# Patient Record
Sex: MALE | Race: WHITE | NOT HISPANIC OR LATINO | Employment: FULL TIME | URBAN - METROPOLITAN AREA
[De-identification: names, ages, dates, MRNs, and addresses within clinical notes are randomized per-mention and may not be internally consistent; named-entity substitution may affect disease eponyms.]

---

## 2018-09-12 ENCOUNTER — TRANSCRIBE ORDERS (OUTPATIENT)
Dept: ADMINISTRATIVE | Facility: HOSPITAL | Age: 43
End: 2018-09-12

## 2018-09-12 ENCOUNTER — HOSPITAL ENCOUNTER (OUTPATIENT)
Dept: RADIOLOGY | Facility: HOSPITAL | Age: 43
Discharge: HOME | End: 2018-09-12
Attending: FAMILY MEDICINE
Payer: COMMERCIAL

## 2018-09-12 DIAGNOSIS — M54.9 DORSALGIA: Primary | ICD-10-CM

## 2018-09-12 DIAGNOSIS — M54.9 DORSALGIA: ICD-10-CM

## 2018-09-12 PROCEDURE — 72110 X-RAY EXAM L-2 SPINE 4/>VWS: CPT

## 2019-06-02 ENCOUNTER — HOSPITAL ENCOUNTER (INPATIENT)
Facility: HOSPITAL | Age: 44
LOS: 2 days | Discharge: HOME | DRG: 871 | End: 2019-06-05
Attending: EMERGENCY MEDICINE | Admitting: INTERNAL MEDICINE

## 2019-06-02 ENCOUNTER — APPOINTMENT (EMERGENCY)
Dept: RADIOLOGY | Facility: HOSPITAL | Age: 44
DRG: 871 | End: 2019-06-02

## 2019-06-02 DIAGNOSIS — R79.82 CRP ELEVATED: ICD-10-CM

## 2019-06-02 DIAGNOSIS — E87.2 LACTIC ACID ACIDOSIS: ICD-10-CM

## 2019-06-02 DIAGNOSIS — R79.89 ELEVATED PROCALCITONIN: ICD-10-CM

## 2019-06-02 DIAGNOSIS — R51.9 HEADACHE: Primary | ICD-10-CM

## 2019-06-02 DIAGNOSIS — R50.9 FEVER: ICD-10-CM

## 2019-06-02 DIAGNOSIS — N17.9 AKI (ACUTE KIDNEY INJURY) (HCC): ICD-10-CM

## 2019-06-02 PROBLEM — G03.9 MENINGITIS: Status: ACTIVE | Noted: 2019-06-02

## 2019-06-02 PROBLEM — A41.9 SEPSIS (HCC): Status: ACTIVE | Noted: 2019-06-02

## 2019-06-02 LAB
ANION GAP SERPL CALCULATED.3IONS-SCNC: 9 MMOL/L (ref 4–13)
BASOPHILS # BLD AUTO: 0.07 THOUSANDS/ΜL (ref 0–0.1)
BASOPHILS NFR BLD AUTO: 1 % (ref 0–1)
BILIRUB UR QL STRIP: ABNORMAL
BUN SERPL-MCNC: 17 MG/DL (ref 5–25)
CALCIUM SERPL-MCNC: 8.5 MG/DL (ref 8.3–10.1)
CHLORIDE SERPL-SCNC: 101 MMOL/L (ref 100–108)
CLARITY UR: CLEAR
CO2 SERPL-SCNC: 29 MMOL/L (ref 21–32)
COLOR UR: ABNORMAL
COLOR, POC: NORMAL
CREAT SERPL-MCNC: 1.43 MG/DL (ref 0.6–1.3)
CRP SERPL QL: >90 MG/L
EOSINOPHIL # BLD AUTO: 0.01 THOUSAND/ΜL (ref 0–0.61)
EOSINOPHIL NFR BLD AUTO: 0 % (ref 0–6)
ERYTHROCYTE [DISTWIDTH] IN BLOOD BY AUTOMATED COUNT: 13.1 % (ref 11.6–15.1)
GFR SERPL CREATININE-BSD FRML MDRD: 60 ML/MIN/1.73SQ M
GLUCOSE SERPL-MCNC: 112 MG/DL (ref 65–140)
GLUCOSE UR STRIP-MCNC: NEGATIVE MG/DL
HCT VFR BLD AUTO: 47.4 % (ref 36.5–49.3)
HGB BLD-MCNC: 15.2 G/DL (ref 12–17)
HGB UR QL STRIP.AUTO: NEGATIVE
IMM GRANULOCYTES # BLD AUTO: 0.04 THOUSAND/UL (ref 0–0.2)
IMM GRANULOCYTES NFR BLD AUTO: 1 % (ref 0–2)
KETONES UR STRIP-MCNC: ABNORMAL MG/DL
LACTATE SERPL-SCNC: 3.1 MMOL/L (ref 0.5–2)
LEUKOCYTE ESTERASE UR QL STRIP: NEGATIVE
LYMPHOCYTES # BLD AUTO: 1.18 THOUSANDS/ΜL (ref 0.6–4.47)
LYMPHOCYTES NFR BLD AUTO: 13 % (ref 14–44)
MCH RBC QN AUTO: 29.3 PG (ref 26.8–34.3)
MCHC RBC AUTO-ENTMCNC: 32.1 G/DL (ref 31.4–37.4)
MCV RBC AUTO: 91 FL (ref 82–98)
MONOCYTES # BLD AUTO: 0.66 THOUSAND/ΜL (ref 0.17–1.22)
MONOCYTES NFR BLD AUTO: 8 % (ref 4–12)
NEUTROPHILS # BLD AUTO: 6.86 THOUSANDS/ΜL (ref 1.85–7.62)
NEUTS SEG NFR BLD AUTO: 77 % (ref 43–75)
NITRITE UR QL STRIP: NEGATIVE
NRBC BLD AUTO-RTO: 0 /100 WBCS
PH UR STRIP.AUTO: 6 [PH] (ref 4.5–8)
PLATELET # BLD AUTO: 168 THOUSANDS/UL (ref 149–390)
PMV BLD AUTO: 12.3 FL (ref 8.9–12.7)
POTASSIUM SERPL-SCNC: 4.1 MMOL/L (ref 3.5–5.3)
PROCALCITONIN SERPL-MCNC: 0.47 NG/ML
PROT UR STRIP-MCNC: ABNORMAL MG/DL
RBC # BLD AUTO: 5.19 MILLION/UL (ref 3.88–5.62)
SODIUM SERPL-SCNC: 139 MMOL/L (ref 136–145)
SP GR UR STRIP.AUTO: >=1.03 (ref 1–1.03)
UROBILINOGEN UR QL STRIP.AUTO: 4 E.U./DL
WBC # BLD AUTO: 8.82 THOUSAND/UL (ref 4.31–10.16)

## 2019-06-02 PROCEDURE — 009U3ZX DRAINAGE OF SPINAL CANAL, PERCUTANEOUS APPROACH, DIAGNOSTIC: ICD-10-PCS | Performed by: EMERGENCY MEDICINE

## 2019-06-02 PROCEDURE — 82945 GLUCOSE OTHER FLUID: CPT | Performed by: EMERGENCY MEDICINE

## 2019-06-02 PROCEDURE — 96365 THER/PROPH/DIAG IV INF INIT: CPT

## 2019-06-02 PROCEDURE — 70450 CT HEAD/BRAIN W/O DYE: CPT

## 2019-06-02 PROCEDURE — 86140 C-REACTIVE PROTEIN: CPT | Performed by: EMERGENCY MEDICINE

## 2019-06-02 PROCEDURE — 36415 COLL VENOUS BLD VENIPUNCTURE: CPT | Performed by: EMERGENCY MEDICINE

## 2019-06-02 PROCEDURE — 84145 PROCALCITONIN (PCT): CPT | Performed by: EMERGENCY MEDICINE

## 2019-06-02 PROCEDURE — 89050 BODY FLUID CELL COUNT: CPT | Performed by: EMERGENCY MEDICINE

## 2019-06-02 PROCEDURE — 96361 HYDRATE IV INFUSION ADD-ON: CPT

## 2019-06-02 PROCEDURE — 80048 BASIC METABOLIC PNL TOTAL CA: CPT | Performed by: EMERGENCY MEDICINE

## 2019-06-02 PROCEDURE — 87483 CNS DNA AMP PROBE TYPE 12-25: CPT | Performed by: EMERGENCY MEDICINE

## 2019-06-02 PROCEDURE — 71046 X-RAY EXAM CHEST 2 VIEWS: CPT

## 2019-06-02 PROCEDURE — 99285 EMERGENCY DEPT VISIT HI MDM: CPT

## 2019-06-02 PROCEDURE — 84157 ASSAY OF PROTEIN OTHER: CPT | Performed by: EMERGENCY MEDICINE

## 2019-06-02 PROCEDURE — 81001 URINALYSIS AUTO W/SCOPE: CPT

## 2019-06-02 PROCEDURE — 85025 COMPLETE CBC W/AUTO DIFF WBC: CPT | Performed by: EMERGENCY MEDICINE

## 2019-06-02 PROCEDURE — 89051 BODY FLUID CELL COUNT: CPT | Performed by: EMERGENCY MEDICINE

## 2019-06-02 PROCEDURE — 87040 BLOOD CULTURE FOR BACTERIA: CPT | Performed by: EMERGENCY MEDICINE

## 2019-06-02 PROCEDURE — 83605 ASSAY OF LACTIC ACID: CPT | Performed by: EMERGENCY MEDICINE

## 2019-06-02 PROCEDURE — 87070 CULTURE OTHR SPECIMN AEROBIC: CPT | Performed by: EMERGENCY MEDICINE

## 2019-06-02 PROCEDURE — 99285 EMERGENCY DEPT VISIT HI MDM: CPT | Performed by: EMERGENCY MEDICINE

## 2019-06-02 PROCEDURE — 96375 TX/PRO/DX INJ NEW DRUG ADDON: CPT

## 2019-06-02 RX ORDER — ACETAMINOPHEN 325 MG/1
975 TABLET ORAL ONCE
Status: COMPLETED | OUTPATIENT
Start: 2019-06-02 | End: 2019-06-02

## 2019-06-02 RX ORDER — LORAZEPAM 2 MG/ML
0.5 INJECTION INTRAMUSCULAR ONCE
Status: COMPLETED | OUTPATIENT
Start: 2019-06-02 | End: 2019-06-02

## 2019-06-02 RX ORDER — ONDANSETRON 2 MG/ML
4 INJECTION INTRAMUSCULAR; INTRAVENOUS ONCE
Status: COMPLETED | OUTPATIENT
Start: 2019-06-02 | End: 2019-06-02

## 2019-06-02 RX ADMIN — ACETAMINOPHEN 975 MG: 325 TABLET ORAL at 20:09

## 2019-06-02 RX ADMIN — CEFTRIAXONE SODIUM 2000 MG: 10 INJECTION, POWDER, FOR SOLUTION INTRAVENOUS at 23:47

## 2019-06-02 RX ADMIN — LORAZEPAM 0.5 MG: 2 INJECTION INTRAMUSCULAR; INTRAVENOUS at 23:46

## 2019-06-02 RX ADMIN — SODIUM CHLORIDE 1000 ML: 0.9 INJECTION, SOLUTION INTRAVENOUS at 21:51

## 2019-06-02 RX ADMIN — SODIUM CHLORIDE 1000 ML: 0.9 INJECTION, SOLUTION INTRAVENOUS at 20:12

## 2019-06-02 RX ADMIN — ONDANSETRON 4 MG: 2 INJECTION INTRAMUSCULAR; INTRAVENOUS at 20:11

## 2019-06-03 PROBLEM — G44.001 INTRACTABLE CLUSTER HEADACHE SYNDROME: Status: ACTIVE | Noted: 2019-06-03

## 2019-06-03 PROBLEM — G03.0 ASEPTIC MENINGITIS: Status: ACTIVE | Noted: 2019-06-02

## 2019-06-03 LAB
ANION GAP SERPL CALCULATED.3IONS-SCNC: 6 MMOL/L (ref 4–13)
APPEARANCE CSF: CLEAR
BACTERIA UR QL AUTO: ABNORMAL /HPF
BUN SERPL-MCNC: 13 MG/DL (ref 5–25)
C GATTII+NEOFOR DNA CSF QL NAA+NON-PROBE: NOT DETECTED
CALCIUM SERPL-MCNC: 7.6 MG/DL (ref 8.3–10.1)
CHLORIDE SERPL-SCNC: 105 MMOL/L (ref 100–108)
CMV DNA CSF QL NAA+NON-PROBE: NOT DETECTED
CO2 SERPL-SCNC: 25 MMOL/L (ref 21–32)
CREAT SERPL-MCNC: 1.24 MG/DL (ref 0.6–1.3)
E COLI K1 DNA CSF QL NAA+NON-PROBE: NOT DETECTED
ERYTHROCYTE [DISTWIDTH] IN BLOOD BY AUTOMATED COUNT: 13.1 % (ref 11.6–15.1)
EV RNA CSF QL NAA+NON-PROBE: NOT DETECTED
FINE GRAN CASTS URNS QL MICRO: ABNORMAL /LPF
GFR SERPL CREATININE-BSD FRML MDRD: 71 ML/MIN/1.73SQ M
GLUCOSE CSF-MCNC: 71 MG/DL (ref 50–80)
GLUCOSE SERPL-MCNC: 124 MG/DL (ref 65–140)
GP B STREP DNA CSF QL NAA+NON-PROBE: NOT DETECTED
GRAM STN SPEC: NORMAL
GRAM STN SPEC: NORMAL
HAEM INFLU DNA CSF QL NAA+NON-PROBE: NOT DETECTED
HCT VFR BLD AUTO: 40.7 % (ref 36.5–49.3)
HGB BLD-MCNC: 13 G/DL (ref 12–17)
HHV6 DNA CSF QL NAA+NON-PROBE: NOT DETECTED
HSV1 DNA CSF QL NAA+NON-PROBE: NOT DETECTED
HSV2 DNA CSF QL NAA+NON-PROBE: NOT DETECTED
HYALINE CASTS #/AREA URNS LPF: ABNORMAL /LPF
L MONOCYTOG DNA CSF QL NAA+NON-PROBE: NOT DETECTED
LACTATE SERPL-SCNC: 1.1 MMOL/L (ref 0.5–2)
LYMPHOCYTES NFR CSF MANUAL: 79 %
MCH RBC QN AUTO: 29.1 PG (ref 26.8–34.3)
MCHC RBC AUTO-ENTMCNC: 31.9 G/DL (ref 31.4–37.4)
MCV RBC AUTO: 91 FL (ref 82–98)
MONOS+MACROS CSF MANUAL: 14 %
MUCOUS THREADS UR QL AUTO: ABNORMAL
N MEN DNA CSF QL NAA+NON-PROBE: NOT DETECTED
NEUTROPHILS NFR CSF MANUAL: 7 %
NON-SQ EPI CELLS URNS QL MICRO: ABNORMAL /HPF
PARECHOVIRUS A RNA CSF QL NAA+NON-PROBE: NOT DETECTED
PATHOLOGIST INTERPRETATION: NORMAL
PATHOLOGY REVIEW: YES
PLATELET # BLD AUTO: 144 THOUSANDS/UL (ref 149–390)
PMV BLD AUTO: 12.6 FL (ref 8.9–12.7)
POTASSIUM SERPL-SCNC: 4.1 MMOL/L (ref 3.5–5.3)
PROCALCITONIN SERPL-MCNC: 0.39 NG/ML
PROT CSF-MCNC: 25 MG/DL (ref 15–45)
RBC # BLD AUTO: 4.47 MILLION/UL (ref 3.88–5.62)
RBC # CSF MANUAL: 2000 UL (ref 0–10)
RBC # CSF MANUAL: 93 UL (ref 0–10)
RBC #/AREA URNS AUTO: ABNORMAL /HPF
S PNEUM DNA CSF QL NAA+NON-PROBE: NOT DETECTED
SODIUM SERPL-SCNC: 136 MMOL/L (ref 136–145)
TOTAL CELLS COUNTED BLD: NO
TOTAL CELLS COUNTED SPEC: 28
TUBE # CSF: 4
VZV DNA CSF QL NAA+NON-PROBE: NOT DETECTED
WBC # BLD AUTO: 6.22 THOUSAND/UL (ref 4.31–10.16)
WBC # CSF AUTO: 2 /UL (ref 0–5)
WBC #/AREA URNS AUTO: ABNORMAL /HPF

## 2019-06-03 PROCEDURE — 80048 BASIC METABOLIC PNL TOTAL CA: CPT | Performed by: INTERNAL MEDICINE

## 2019-06-03 PROCEDURE — 84145 PROCALCITONIN (PCT): CPT | Performed by: INTERNAL MEDICINE

## 2019-06-03 PROCEDURE — 99233 SBSQ HOSP IP/OBS HIGH 50: CPT | Performed by: NURSE PRACTITIONER

## 2019-06-03 PROCEDURE — 99220 PR INITIAL OBSERVATION CARE/DAY 70 MINUTES: CPT | Performed by: INTERNAL MEDICINE

## 2019-06-03 PROCEDURE — 36415 COLL VENOUS BLD VENIPUNCTURE: CPT | Performed by: INTERNAL MEDICINE

## 2019-06-03 PROCEDURE — 85027 COMPLETE CBC AUTOMATED: CPT | Performed by: INTERNAL MEDICINE

## 2019-06-03 RX ORDER — METOCLOPRAMIDE HYDROCHLORIDE 5 MG/ML
10 INJECTION INTRAMUSCULAR; INTRAVENOUS EVERY 8 HOURS SCHEDULED
Status: DISCONTINUED | OUTPATIENT
Start: 2019-06-03 | End: 2019-06-05 | Stop reason: HOSPADM

## 2019-06-03 RX ORDER — CYCLOBENZAPRINE HCL 10 MG
10 TABLET ORAL EVERY MORNING
Status: COMPLETED | OUTPATIENT
Start: 2019-06-03 | End: 2019-06-05

## 2019-06-03 RX ORDER — ONDANSETRON 2 MG/ML
4 INJECTION INTRAMUSCULAR; INTRAVENOUS EVERY 6 HOURS PRN
Status: DISCONTINUED | OUTPATIENT
Start: 2019-06-03 | End: 2019-06-05 | Stop reason: HOSPADM

## 2019-06-03 RX ORDER — MAGNESIUM SULFATE HEPTAHYDRATE 40 MG/ML
2 INJECTION, SOLUTION INTRAVENOUS ONCE
Status: COMPLETED | OUTPATIENT
Start: 2019-06-03 | End: 2019-06-03

## 2019-06-03 RX ORDER — KETOROLAC TROMETHAMINE 30 MG/ML
30 INJECTION, SOLUTION INTRAMUSCULAR; INTRAVENOUS EVERY 12 HOURS SCHEDULED
Status: DISCONTINUED | OUTPATIENT
Start: 2019-06-03 | End: 2019-06-05 | Stop reason: HOSPADM

## 2019-06-03 RX ORDER — ACETAMINOPHEN 325 MG/1
650 TABLET ORAL EVERY 4 HOURS PRN
Status: DISCONTINUED | OUTPATIENT
Start: 2019-06-03 | End: 2019-06-05 | Stop reason: HOSPADM

## 2019-06-03 RX ORDER — OXYCODONE HYDROCHLORIDE 10 MG/1
10 TABLET ORAL EVERY 4 HOURS PRN
Status: DISCONTINUED | OUTPATIENT
Start: 2019-06-03 | End: 2019-06-05 | Stop reason: HOSPADM

## 2019-06-03 RX ORDER — HYDROMORPHONE HCL/PF 1 MG/ML
0.5 SYRINGE (ML) INJECTION EVERY 4 HOURS PRN
Status: DISCONTINUED | OUTPATIENT
Start: 2019-06-03 | End: 2019-06-04

## 2019-06-03 RX ORDER — OXYCODONE HYDROCHLORIDE 5 MG/1
5 TABLET ORAL EVERY 4 HOURS PRN
Status: DISCONTINUED | OUTPATIENT
Start: 2019-06-03 | End: 2019-06-05 | Stop reason: HOSPADM

## 2019-06-03 RX ORDER — SODIUM CHLORIDE 9 MG/ML
125 INJECTION, SOLUTION INTRAVENOUS CONTINUOUS
Status: DISCONTINUED | OUTPATIENT
Start: 2019-06-03 | End: 2019-06-05 | Stop reason: HOSPADM

## 2019-06-03 RX ORDER — DIPHENHYDRAMINE HYDROCHLORIDE 50 MG/ML
25 INJECTION INTRAMUSCULAR; INTRAVENOUS EVERY 8 HOURS PRN
Status: DISCONTINUED | OUTPATIENT
Start: 2019-06-03 | End: 2019-06-05 | Stop reason: HOSPADM

## 2019-06-03 RX ORDER — HEPARIN SODIUM 5000 [USP'U]/ML
5000 INJECTION, SOLUTION INTRAVENOUS; SUBCUTANEOUS EVERY 8 HOURS SCHEDULED
Status: DISCONTINUED | OUTPATIENT
Start: 2019-06-03 | End: 2019-06-04

## 2019-06-03 RX ADMIN — HEPARIN SODIUM 5000 UNITS: 5000 INJECTION INTRAVENOUS; SUBCUTANEOUS at 06:46

## 2019-06-03 RX ADMIN — OXYCODONE HYDROCHLORIDE 10 MG: 10 TABLET ORAL at 10:58

## 2019-06-03 RX ADMIN — METOCLOPRAMIDE 10 MG: 5 INJECTION, SOLUTION INTRAMUSCULAR; INTRAVENOUS at 14:01

## 2019-06-03 RX ADMIN — DIPHENHYDRAMINE HYDROCHLORIDE 25 MG: 50 INJECTION, SOLUTION INTRAMUSCULAR; INTRAVENOUS at 11:41

## 2019-06-03 RX ADMIN — METOCLOPRAMIDE 10 MG: 5 INJECTION, SOLUTION INTRAMUSCULAR; INTRAVENOUS at 21:28

## 2019-06-03 RX ADMIN — CYCLOBENZAPRINE HYDROCHLORIDE 10 MG: 10 TABLET, FILM COATED ORAL at 11:39

## 2019-06-03 RX ADMIN — KETOROLAC TROMETHAMINE 30 MG: 30 INJECTION, SOLUTION INTRAMUSCULAR at 11:40

## 2019-06-03 RX ADMIN — HEPARIN SODIUM 5000 UNITS: 5000 INJECTION INTRAVENOUS; SUBCUTANEOUS at 14:01

## 2019-06-03 RX ADMIN — HEPARIN SODIUM 5000 UNITS: 5000 INJECTION INTRAVENOUS; SUBCUTANEOUS at 21:28

## 2019-06-03 RX ADMIN — HYDROMORPHONE HYDROCHLORIDE 0.5 MG: 1 INJECTION, SOLUTION INTRAMUSCULAR; INTRAVENOUS; SUBCUTANEOUS at 08:26

## 2019-06-03 RX ADMIN — MAGNESIUM SULFATE HEPTAHYDRATE 2 G: 40 INJECTION, SOLUTION INTRAVENOUS at 11:44

## 2019-06-03 RX ADMIN — KETOROLAC TROMETHAMINE 30 MG: 30 INJECTION, SOLUTION INTRAMUSCULAR at 21:28

## 2019-06-03 RX ADMIN — OXYCODONE HYDROCHLORIDE 5 MG: 5 TABLET ORAL at 02:31

## 2019-06-03 RX ADMIN — ONDANSETRON 4 MG: 2 INJECTION INTRAMUSCULAR; INTRAVENOUS at 11:51

## 2019-06-03 RX ADMIN — ACETAMINOPHEN 650 MG: 325 TABLET, FILM COATED ORAL at 05:07

## 2019-06-03 RX ADMIN — OXYCODONE HYDROCHLORIDE 5 MG: 5 TABLET ORAL at 06:45

## 2019-06-03 RX ADMIN — SODIUM CHLORIDE 125 ML/HR: 0.9 INJECTION, SOLUTION INTRAVENOUS at 11:01

## 2019-06-03 RX ADMIN — ACETAMINOPHEN 650 MG: 325 TABLET, FILM COATED ORAL at 14:01

## 2019-06-03 RX ADMIN — SODIUM CHLORIDE 125 ML/HR: 0.9 INJECTION, SOLUTION INTRAVENOUS at 02:32

## 2019-06-04 PROCEDURE — 99233 SBSQ HOSP IP/OBS HIGH 50: CPT | Performed by: NURSE PRACTITIONER

## 2019-06-04 RX ADMIN — ACETAMINOPHEN 650 MG: 325 TABLET, FILM COATED ORAL at 02:01

## 2019-06-04 RX ADMIN — METOCLOPRAMIDE 10 MG: 5 INJECTION, SOLUTION INTRAMUSCULAR; INTRAVENOUS at 06:49

## 2019-06-04 RX ADMIN — METOCLOPRAMIDE 10 MG: 5 INJECTION, SOLUTION INTRAMUSCULAR; INTRAVENOUS at 14:10

## 2019-06-04 RX ADMIN — SODIUM CHLORIDE 125 ML/HR: 0.9 INJECTION, SOLUTION INTRAVENOUS at 10:14

## 2019-06-04 RX ADMIN — METOCLOPRAMIDE 10 MG: 5 INJECTION, SOLUTION INTRAMUSCULAR; INTRAVENOUS at 21:37

## 2019-06-04 RX ADMIN — CYCLOBENZAPRINE HYDROCHLORIDE 10 MG: 10 TABLET, FILM COATED ORAL at 07:57

## 2019-06-04 RX ADMIN — OXYCODONE HYDROCHLORIDE 5 MG: 5 TABLET ORAL at 01:53

## 2019-06-04 RX ADMIN — SODIUM CHLORIDE 125 ML/HR: 0.9 INJECTION, SOLUTION INTRAVENOUS at 02:01

## 2019-06-04 RX ADMIN — KETOROLAC TROMETHAMINE 30 MG: 30 INJECTION, SOLUTION INTRAMUSCULAR at 07:57

## 2019-06-04 RX ADMIN — KETOROLAC TROMETHAMINE 30 MG: 30 INJECTION, SOLUTION INTRAMUSCULAR at 21:36

## 2019-06-04 RX ADMIN — HEPARIN SODIUM 5000 UNITS: 5000 INJECTION INTRAVENOUS; SUBCUTANEOUS at 06:49

## 2019-06-04 RX ADMIN — ONDANSETRON 4 MG: 2 INJECTION INTRAMUSCULAR; INTRAVENOUS at 10:10

## 2019-06-04 RX ADMIN — HYDROMORPHONE HYDROCHLORIDE 0.5 MG: 1 INJECTION, SOLUTION INTRAMUSCULAR; INTRAVENOUS; SUBCUTANEOUS at 04:29

## 2019-06-04 RX ADMIN — SODIUM CHLORIDE 125 ML/HR: 0.9 INJECTION, SOLUTION INTRAVENOUS at 18:07

## 2019-06-05 VITALS
BODY MASS INDEX: 26.77 KG/M2 | HEIGHT: 73 IN | WEIGHT: 202 LBS | SYSTOLIC BLOOD PRESSURE: 113 MMHG | RESPIRATION RATE: 16 BRPM | DIASTOLIC BLOOD PRESSURE: 75 MMHG | HEART RATE: 106 BPM | OXYGEN SATURATION: 96 % | TEMPERATURE: 98.4 F

## 2019-06-05 PROBLEM — G03.0 ASEPTIC MENINGITIS: Status: RESOLVED | Noted: 2019-06-02 | Resolved: 2019-06-05

## 2019-06-05 PROBLEM — G44.001 INTRACTABLE CLUSTER HEADACHE SYNDROME: Status: RESOLVED | Noted: 2019-06-03 | Resolved: 2019-06-05

## 2019-06-05 PROBLEM — N17.9 ACUTE KIDNEY INJURY (HCC): Status: RESOLVED | Noted: 2019-06-02 | Resolved: 2019-06-05

## 2019-06-05 PROBLEM — R51.0 HEADACHE DUE TO LOW CEREBROSPINAL FLUID PRESSURE: Status: ACTIVE | Noted: 2019-06-05

## 2019-06-05 PROBLEM — R51.0 HEADACHE DUE TO LOW CEREBROSPINAL FLUID PRESSURE: Status: RESOLVED | Noted: 2019-06-05 | Resolved: 2019-06-05

## 2019-06-05 PROBLEM — A41.9 SEPSIS (HCC): Status: RESOLVED | Noted: 2019-06-02 | Resolved: 2019-06-05

## 2019-06-05 LAB
ANION GAP SERPL CALCULATED.3IONS-SCNC: 4 MMOL/L (ref 4–13)
BUN SERPL-MCNC: 10 MG/DL (ref 5–25)
CALCIUM SERPL-MCNC: 7.9 MG/DL (ref 8.3–10.1)
CHLORIDE SERPL-SCNC: 108 MMOL/L (ref 100–108)
CO2 SERPL-SCNC: 27 MMOL/L (ref 21–32)
CREAT SERPL-MCNC: 1.02 MG/DL (ref 0.6–1.3)
GFR SERPL CREATININE-BSD FRML MDRD: 90 ML/MIN/1.73SQ M
GLUCOSE SERPL-MCNC: 106 MG/DL (ref 65–140)
POTASSIUM SERPL-SCNC: 3.7 MMOL/L (ref 3.5–5.3)
SODIUM SERPL-SCNC: 139 MMOL/L (ref 136–145)

## 2019-06-05 PROCEDURE — 80048 BASIC METABOLIC PNL TOTAL CA: CPT | Performed by: PHYSICIAN ASSISTANT

## 2019-06-05 PROCEDURE — 99239 HOSP IP/OBS DSCHRG MGMT >30: CPT | Performed by: PHYSICIAN ASSISTANT

## 2019-06-05 RX ADMIN — KETOROLAC TROMETHAMINE 30 MG: 30 INJECTION, SOLUTION INTRAMUSCULAR at 08:37

## 2019-06-05 RX ADMIN — SODIUM CHLORIDE 125 ML/HR: 0.9 INJECTION, SOLUTION INTRAVENOUS at 12:00

## 2019-06-05 RX ADMIN — METOCLOPRAMIDE 10 MG: 5 INJECTION, SOLUTION INTRAMUSCULAR; INTRAVENOUS at 06:15

## 2019-06-05 RX ADMIN — SODIUM CHLORIDE 125 ML/HR: 0.9 INJECTION, SOLUTION INTRAVENOUS at 03:30

## 2019-06-05 RX ADMIN — CYCLOBENZAPRINE HYDROCHLORIDE 10 MG: 10 TABLET, FILM COATED ORAL at 08:37

## 2019-06-06 LAB — BACTERIA CSF CULT: NO GROWTH

## 2019-06-07 LAB
BACTERIA BLD CULT: NORMAL
BACTERIA BLD CULT: NORMAL

## 2019-06-08 ENCOUNTER — HOSPITAL ENCOUNTER (EMERGENCY)
Facility: HOSPITAL | Age: 44
Discharge: HOME | End: 2019-06-08
Attending: EMERGENCY MEDICINE | Admitting: EMERGENCY MEDICINE

## 2019-06-08 VITALS
SYSTOLIC BLOOD PRESSURE: 150 MMHG | HEART RATE: 82 BPM | BODY MASS INDEX: 26.64 KG/M2 | WEIGHT: 201.94 LBS | DIASTOLIC BLOOD PRESSURE: 92 MMHG | TEMPERATURE: 97.5 F | RESPIRATION RATE: 16 BRPM | OXYGEN SATURATION: 98 %

## 2019-06-08 DIAGNOSIS — R74.01 TRANSAMINITIS: ICD-10-CM

## 2019-06-08 DIAGNOSIS — R51.9 HEADACHE: Primary | ICD-10-CM

## 2019-06-08 LAB
ALBUMIN SERPL BCP-MCNC: 3.1 G/DL (ref 3.5–5)
ALBUMIN SERPL BCP-MCNC: 3.7 G/DL (ref 3.5–5)
ALP SERPL-CCNC: 102 U/L (ref 46–116)
ALP SERPL-CCNC: 119 U/L (ref 46–116)
ALT SERPL W P-5'-P-CCNC: 146 U/L (ref 12–78)
ALT SERPL W P-5'-P-CCNC: 161 U/L (ref 12–78)
ANION GAP SERPL CALCULATED.3IONS-SCNC: 5 MMOL/L (ref 4–13)
ANION GAP SERPL CALCULATED.3IONS-SCNC: 6 MMOL/L (ref 4–13)
APAP SERPL-MCNC: <2 UG/ML (ref 10–20)
AST SERPL W P-5'-P-CCNC: 58 U/L (ref 5–45)
AST SERPL W P-5'-P-CCNC: 64 U/L (ref 5–45)
BASOPHILS # BLD AUTO: 0.1 THOUSANDS/ΜL (ref 0–0.1)
BASOPHILS NFR BLD AUTO: 1 % (ref 0–1)
BILIRUB SERPL-MCNC: 0.53 MG/DL (ref 0.2–1)
BILIRUB SERPL-MCNC: 0.6 MG/DL (ref 0.2–1)
BUN SERPL-MCNC: 10 MG/DL (ref 5–25)
BUN SERPL-MCNC: 9 MG/DL (ref 5–25)
CALCIUM SERPL-MCNC: 8.3 MG/DL (ref 8.3–10.1)
CALCIUM SERPL-MCNC: 9.1 MG/DL (ref 8.3–10.1)
CHLORIDE SERPL-SCNC: 108 MMOL/L (ref 100–108)
CHLORIDE SERPL-SCNC: 109 MMOL/L (ref 100–108)
CO2 SERPL-SCNC: 24 MMOL/L (ref 21–32)
CO2 SERPL-SCNC: 29 MMOL/L (ref 21–32)
CREAT SERPL-MCNC: 0.96 MG/DL (ref 0.6–1.3)
CREAT SERPL-MCNC: 1.06 MG/DL (ref 0.6–1.3)
EOSINOPHIL # BLD AUTO: 0.22 THOUSAND/ΜL (ref 0–0.61)
EOSINOPHIL NFR BLD AUTO: 2 % (ref 0–6)
ERYTHROCYTE [DISTWIDTH] IN BLOOD BY AUTOMATED COUNT: 13.5 % (ref 11.6–15.1)
GFR SERPL CREATININE-BSD FRML MDRD: 86 ML/MIN/1.73SQ M
GFR SERPL CREATININE-BSD FRML MDRD: 97 ML/MIN/1.73SQ M
GLUCOSE SERPL-MCNC: 87 MG/DL (ref 65–140)
GLUCOSE SERPL-MCNC: 95 MG/DL (ref 65–140)
HCT VFR BLD AUTO: 44.6 % (ref 36.5–49.3)
HGB BLD-MCNC: 14.3 G/DL (ref 12–17)
IMM GRANULOCYTES # BLD AUTO: 0.2 THOUSAND/UL (ref 0–0.2)
IMM GRANULOCYTES NFR BLD AUTO: 2 % (ref 0–2)
LYMPHOCYTES # BLD AUTO: 2.29 THOUSANDS/ΜL (ref 0.6–4.47)
LYMPHOCYTES NFR BLD AUTO: 19 % (ref 14–44)
MCH RBC QN AUTO: 28.9 PG (ref 26.8–34.3)
MCHC RBC AUTO-ENTMCNC: 32.1 G/DL (ref 31.4–37.4)
MCV RBC AUTO: 90 FL (ref 82–98)
MONOCYTES # BLD AUTO: 0.83 THOUSAND/ΜL (ref 0.17–1.22)
MONOCYTES NFR BLD AUTO: 7 % (ref 4–12)
NEUTROPHILS # BLD AUTO: 8.62 THOUSANDS/ΜL (ref 1.85–7.62)
NEUTS SEG NFR BLD AUTO: 69 % (ref 43–75)
NRBC BLD AUTO-RTO: 0 /100 WBCS
PLATELET # BLD AUTO: 365 THOUSANDS/UL (ref 149–390)
PMV BLD AUTO: 12.2 FL (ref 8.9–12.7)
POTASSIUM SERPL-SCNC: 3.9 MMOL/L (ref 3.5–5.3)
POTASSIUM SERPL-SCNC: 4 MMOL/L (ref 3.5–5.3)
PROT SERPL-MCNC: 7.1 G/DL (ref 6.4–8.2)
PROT SERPL-MCNC: 7.8 G/DL (ref 6.4–8.2)
RBC # BLD AUTO: 4.95 MILLION/UL (ref 3.88–5.62)
SODIUM SERPL-SCNC: 139 MMOL/L (ref 136–145)
SODIUM SERPL-SCNC: 142 MMOL/L (ref 136–145)
WBC # BLD AUTO: 12.26 THOUSAND/UL (ref 4.31–10.16)

## 2019-06-08 PROCEDURE — 99284 EMERGENCY DEPT VISIT MOD MDM: CPT | Performed by: EMERGENCY MEDICINE

## 2019-06-08 PROCEDURE — 36415 COLL VENOUS BLD VENIPUNCTURE: CPT | Performed by: EMERGENCY MEDICINE

## 2019-06-08 PROCEDURE — 99285 EMERGENCY DEPT VISIT HI MDM: CPT

## 2019-06-08 PROCEDURE — 80329 ANALGESICS NON-OPIOID 1 OR 2: CPT | Performed by: EMERGENCY MEDICINE

## 2019-06-08 PROCEDURE — 96375 TX/PRO/DX INJ NEW DRUG ADDON: CPT

## 2019-06-08 PROCEDURE — 96361 HYDRATE IV INFUSION ADD-ON: CPT

## 2019-06-08 PROCEDURE — 85025 COMPLETE CBC W/AUTO DIFF WBC: CPT | Performed by: EMERGENCY MEDICINE

## 2019-06-08 PROCEDURE — 93005 ELECTROCARDIOGRAM TRACING: CPT

## 2019-06-08 PROCEDURE — 96365 THER/PROPH/DIAG IV INF INIT: CPT

## 2019-06-08 PROCEDURE — 80053 COMPREHEN METABOLIC PANEL: CPT | Performed by: EMERGENCY MEDICINE

## 2019-06-08 RX ORDER — CAFFEINE CITRATE 20 MG/ML
60 SOLUTION INTRAVENOUS ONCE
Status: DISCONTINUED | OUTPATIENT
Start: 2019-06-08 | End: 2019-06-08

## 2019-06-08 RX ORDER — LORAZEPAM 2 MG/ML
0.5 INJECTION INTRAMUSCULAR ONCE
Status: DISCONTINUED | OUTPATIENT
Start: 2019-06-08 | End: 2019-06-08

## 2019-06-08 RX ORDER — HALOPERIDOL 5 MG/ML
2.5 INJECTION INTRAMUSCULAR ONCE
Status: COMPLETED | OUTPATIENT
Start: 2019-06-08 | End: 2019-06-08

## 2019-06-08 RX ORDER — KETOROLAC TROMETHAMINE 30 MG/ML
15 INJECTION, SOLUTION INTRAMUSCULAR; INTRAVENOUS ONCE
Status: COMPLETED | OUTPATIENT
Start: 2019-06-08 | End: 2019-06-08

## 2019-06-08 RX ORDER — ACETAMINOPHEN 325 MG/1
650 TABLET ORAL ONCE
Status: COMPLETED | OUTPATIENT
Start: 2019-06-08 | End: 2019-06-08

## 2019-06-08 RX ORDER — LORAZEPAM 2 MG/ML
1 INJECTION INTRAMUSCULAR ONCE
Status: COMPLETED | OUTPATIENT
Start: 2019-06-08 | End: 2019-06-08

## 2019-06-08 RX ORDER — ACETYLCYSTEINE 200 MG/ML
140 SOLUTION ORAL; RESPIRATORY (INHALATION) ONCE
Status: DISCONTINUED | OUTPATIENT
Start: 2019-06-08 | End: 2019-06-08

## 2019-06-08 RX ADMIN — LORAZEPAM 1 MG: 2 INJECTION INTRAMUSCULAR; INTRAVENOUS at 12:02

## 2019-06-08 RX ADMIN — ACETAMINOPHEN 650 MG: 325 TABLET ORAL at 11:35

## 2019-06-08 RX ADMIN — KETOROLAC TROMETHAMINE 15 MG: 30 INJECTION, SOLUTION INTRAMUSCULAR at 11:37

## 2019-06-08 RX ADMIN — SODIUM CHLORIDE 1000 ML: 0.9 INJECTION, SOLUTION INTRAVENOUS at 11:34

## 2019-06-08 RX ADMIN — CAFFEINE AND SODIUM BENZOATE 500 MG: 125 INJECTION, SOLUTION INTRAMUSCULAR; INTRAVENOUS at 13:01

## 2019-06-08 RX ADMIN — HALOPERIDOL LACTATE 2.5 MG: 5 INJECTION INTRAMUSCULAR at 11:38

## 2019-06-09 LAB
ATRIAL RATE: 58 BPM
P AXIS: 58 DEGREES
PR INTERVAL: 146 MS
QRS AXIS: 53 DEGREES
QRSD INTERVAL: 78 MS
QT INTERVAL: 452 MS
QTC INTERVAL: 443 MS
T WAVE AXIS: 50 DEGREES
VENTRICULAR RATE: 58 BPM

## 2019-06-09 PROCEDURE — 93010 ELECTROCARDIOGRAM REPORT: CPT | Performed by: INTERNAL MEDICINE

## 2021-04-08 DIAGNOSIS — Z23 ENCOUNTER FOR IMMUNIZATION: ICD-10-CM

## 2022-02-13 ENCOUNTER — HOSPITAL ENCOUNTER (EMERGENCY)
Facility: HOSPITAL | Age: 47
Discharge: HOME | End: 2022-02-13
Attending: EMERGENCY MEDICINE
Payer: COMMERCIAL

## 2022-02-13 VITALS
SYSTOLIC BLOOD PRESSURE: 136 MMHG | OXYGEN SATURATION: 98 % | RESPIRATION RATE: 18 BRPM | TEMPERATURE: 98 F | DIASTOLIC BLOOD PRESSURE: 81 MMHG | HEART RATE: 84 BPM

## 2022-02-13 DIAGNOSIS — R51.9 HEADACHE: Primary | ICD-10-CM

## 2022-02-13 PROCEDURE — 99284 EMERGENCY DEPT VISIT MOD MDM: CPT | Performed by: EMERGENCY MEDICINE

## 2022-02-13 PROCEDURE — 96372 THER/PROPH/DIAG INJ SC/IM: CPT

## 2022-02-13 PROCEDURE — 99283 EMERGENCY DEPT VISIT LOW MDM: CPT

## 2022-02-13 RX ORDER — KETOROLAC TROMETHAMINE 30 MG/ML
15 INJECTION, SOLUTION INTRAMUSCULAR; INTRAVENOUS ONCE
Status: COMPLETED | OUTPATIENT
Start: 2022-02-13 | End: 2022-02-13

## 2022-02-13 RX ORDER — ACETAMINOPHEN 325 MG/1
975 TABLET ORAL ONCE
Status: COMPLETED | OUTPATIENT
Start: 2022-02-13 | End: 2022-02-13

## 2022-02-13 RX ORDER — METOCLOPRAMIDE 10 MG/1
10 TABLET ORAL ONCE
Status: COMPLETED | OUTPATIENT
Start: 2022-02-13 | End: 2022-02-13

## 2022-02-13 RX ADMIN — METOCLOPRAMIDE 10 MG: 10 TABLET ORAL at 13:38

## 2022-02-13 RX ADMIN — ACETAMINOPHEN 975 MG: 325 TABLET, FILM COATED ORAL at 13:38

## 2022-02-13 RX ADMIN — KETOROLAC TROMETHAMINE 15 MG: 30 INJECTION, SOLUTION INTRAMUSCULAR at 13:38

## 2022-02-13 NOTE — ED PROVIDER NOTES
History  Chief Complaint   Patient presents with    Headache - New Onset or New Symptoms     pt started with a off and on HA on Friday morning that he woke up with, he states it feels like a drill going into the top left side of his head  -n/v no sens to light or sound  OTC meds not helping  41 yo M w/ hx of recurring headaches presents w/ headache x 3 days  Headache described as intermittent, sharp, "chisel to the inside of my head," worse w/ sudden head movements, and similar to prior headaches in intensity and character  Pt took prescribed duexis upon headache onset which normally aborts the headache but had no effect  Denies head trauma, fevers, neck trauma, neck stiffness, N/V, recent illnesses, changes in vision, dysphonia, photophobia, phonophobia, or neuro symptoms  Prior to Admission Medications   Prescriptions Last Dose Informant Patient Reported? Taking? Ibuprofen-Famotidine (DUEXIS PO)   Yes No   Sig: Take 800 mg by mouth as needed (For cluster headaches)      Facility-Administered Medications: None       Past Medical History:   Diagnosis Date    Headaches, cluster     Migraine        Past Surgical History:   Procedure Laterality Date    VOCAL CORD INJECTION         History reviewed  No pertinent family history  I have reviewed and agree with the history as documented  E-Cigarette/Vaping     E-Cigarette/Vaping Substances     Social History     Tobacco Use    Smoking status: Former Smoker     Types: Cigarettes     Quit date: 2019     Years since quittin 7    Smokeless tobacco: Never Used   Substance Use Topics    Alcohol use: Never    Drug use: Never        Review of Systems   All other systems reviewed and are negative        Physical Exam  ED Triage Vitals   Temperature Pulse Respirations Blood Pressure SpO2   22 1259 22 1259 22 1259 22 1259 22 1259   98 °F (36 7 °C) 84 18 136/81 98 %      Temp Source Heart Rate Source Patient Position - Orthostatic VS BP Location FiO2 (%)   02/13/22 1259 -- -- -- --   Oral          Pain Score       02/13/22 1338       10 - Worst Possible Pain             Orthostatic Vital Signs  Vitals:    02/13/22 1259   BP: 136/81   Pulse: 84       Physical Exam  Vitals and nursing note reviewed  Constitutional:       General: He is in acute distress  Appearance: Normal appearance  He is well-developed and normal weight  He is not ill-appearing, toxic-appearing or diaphoretic  HENT:      Head: Normocephalic and atraumatic  Right Ear: External ear normal       Left Ear: External ear normal       Nose: Nose normal       Mouth/Throat:      Mouth: Mucous membranes are moist    Eyes:      General:         Right eye: No discharge  Left eye: No discharge  Extraocular Movements: Extraocular movements intact  Conjunctiva/sclera: Conjunctivae normal       Pupils: Pupils are equal, round, and reactive to light  Cardiovascular:      Rate and Rhythm: Normal rate and regular rhythm  Pulses: Normal pulses  Heart sounds: Normal heart sounds  No murmur heard  Pulmonary:      Effort: Pulmonary effort is normal  No respiratory distress  Breath sounds: Normal breath sounds  No wheezing  Abdominal:      Palpations: Abdomen is soft  Tenderness: There is no abdominal tenderness  Musculoskeletal:         General: Normal range of motion  Cervical back: Normal range of motion and neck supple  No rigidity or tenderness  Skin:     General: Skin is warm and dry  Capillary Refill: Capillary refill takes less than 2 seconds  Neurological:      General: No focal deficit present  Mental Status: He is alert and oriented to person, place, and time  Cranial Nerves: No cranial nerve deficit  Sensory: No sensory deficit  Motor: No weakness        Coordination: Coordination normal       Gait: Gait normal    Psychiatric:         Mood and Affect: Mood normal  Behavior: Behavior normal          ED Medications  Medications   acetaminophen (TYLENOL) tablet 975 mg (975 mg Oral Given 2/13/22 1338)   metoclopramide (REGLAN) tablet 10 mg (10 mg Oral Given 2/13/22 1338)   ketorolac (TORADOL) injection 15 mg (15 mg Intramuscular Given 2/13/22 1338)       Diagnostic Studies  Results Reviewed     None                 No orders to display         Procedures  Procedures      ED Course  ED Course as of 02/13/22 1854   Sun Feb 13, 2022   1336 Pt agrees to initial treatment of tylenol, toradol, reglan PO and re-assess  1425 Pt notes moderate improvement in sxs  Spasms less intense and less frequent  Prefers to rest in room for a few more minutes to see if he improves further  1521 Sxs improved  Pt feels well enough to go home  MDM  Number of Diagnoses or Management Options  Headache: established and worsening  Diagnosis management comments: Impression: primary headache, VS normal, no systemic sxs, pt well-appearing  Plan: symptomatic treatment, observe    Sxs resolved w/ symptomatic treatment  Discharge to home  Amount and/or Complexity of Data Reviewed  Review and summarize past medical records: yes    Risk of Complications, Morbidity, and/or Mortality  Presenting problems: low  Diagnostic procedures: minimal  Management options: low    Patient Progress  Patient progress: resolved      Disposition  Final diagnoses:   Headache     Time reflects when diagnosis was documented in both MDM as applicable and the Disposition within this note     Time User Action Codes Description Comment    2/13/2022  1:31 PM Carissa Talamantes [R51 [id number]] Headache       ED Disposition     ED Disposition Condition Date/Time Comment    Discharge Stable [place] Feb 13, 2022  3:22 PM Dax Cervantes discharge to home/self care              Follow-up Information     Follow up With Specialties Details Why Contact Info    Marcelino Alatorre MD [facility] Medicine Call  If symptoms worsen [facility]  [address]            Discharge Medication List as of 2/13/2022  3:26 PM      CONTINUE these medications which have NOT CHANGED    Details   Ibuprofen-Famotidine (DUEXIS PO) Take 800 mg by mouth as needed (For cluster headaches), Historical Med           No discharge procedures on file  PDMP Review     None           ED Provider  Attending physically available and evaluated Caren Larsen  WILBERT managed the patient along with the ED Attending      Electronically Signed by         Promise Delcid MD  02/13/22 5069

## 2022-02-13 NOTE — ED ATTENDING ATTESTATION
2/13/2022  IAmie MD, saw and evaluated the patient  I have discussed the patient with the resident/non-physician practitioner and agree with the resident's/non-physician practitioner's findings, Plan of Care, and MDM as documented in the resident's/non-physician practitioner's note, except where noted  All available labs and Radiology studies were reviewed  I was present for key portions of any procedure(s) performed by the resident/non-physician practitioner and I was immediately available to provide assistance  At this point I agree with the current assessment done in the Emergency Department  I have conducted an independent evaluation of this patient a history and physical is as follows:    ED Course         Critical Care Time  Procedures    41 yo male with hx of headaches, having similar headache started few days ago with intermittent sharp pain on temple  Pt took otc meds with no relief  Gradual onset  No numbness, tingling, weakness, no fever, no neck pain  No n/v   Vss, afebrile, lungs cta, rrr, abdomen soft nontender, no neuro deficits  Migraine like headache  Pain meds

## 2022-02-13 NOTE — DISCHARGE INSTRUCTIONS
Return to ER if headache worsens and is not controlled with tylenol and motrin  If a similar headache re-occurs, try the following all at once  Tylenol 1000 mg  Motrin (ibuprofen) 400 mg  Benadryl 25 mg - ONLY if you have nothing to do the rest of the day  If will make you drowsy  Do not drive up to 6 hours after taking  Lie in a quiet, dark place    Drink water

## 2022-07-27 VITALS
WEIGHT: 195 LBS | DIASTOLIC BLOOD PRESSURE: 79 MMHG | SYSTOLIC BLOOD PRESSURE: 137 MMHG | HEART RATE: 102 BPM | OXYGEN SATURATION: 97 % | TEMPERATURE: 97.7 F | RESPIRATION RATE: 18 BRPM | BODY MASS INDEX: 25.73 KG/M2

## 2022-07-27 PROCEDURE — 99283 EMERGENCY DEPT VISIT LOW MDM: CPT

## 2022-07-28 ENCOUNTER — HOSPITAL ENCOUNTER (EMERGENCY)
Facility: HOSPITAL | Age: 47
Discharge: HOME | End: 2022-07-28
Attending: EMERGENCY MEDICINE
Payer: COMMERCIAL

## 2022-07-28 ENCOUNTER — TELEPHONE (OUTPATIENT)
Dept: OBGYN CLINIC | Facility: HOSPITAL | Age: 47
End: 2022-07-28

## 2022-07-28 ENCOUNTER — APPOINTMENT (EMERGENCY)
Dept: RADIOLOGY | Facility: HOSPITAL | Age: 47
End: 2022-07-28
Payer: COMMERCIAL

## 2022-07-28 DIAGNOSIS — S62.339A BOXER'S FRACTURE: Primary | ICD-10-CM

## 2022-07-28 PROCEDURE — 73130 X-RAY EXAM OF HAND: CPT

## 2022-07-28 PROCEDURE — 99285 EMERGENCY DEPT VISIT HI MDM: CPT | Performed by: EMERGENCY MEDICINE

## 2022-07-28 NOTE — ED PROVIDER NOTES
History  Chief Complaint   Patient presents with    Hand Injury     Pt states was playing baseball slide and hurt his L hand  States swollen and painful  Patient is a 62-year-old male presenting to the emergency department chief complaint of left hand pain  Patient states he slid diving to catch a ball and landed on his left hand  He denies any numbness or tingling to the extremity he denies any other injury he denies any head strike or loss of consciousness  Patient states that is very swollen and painful to move  Patient did not take anything for his symptoms prior to arrival   Patient is right-hand dominant  Prior to Admission Medications   Prescriptions Last Dose Informant Patient Reported? Taking? Ibuprofen-Famotidine (DUEXIS PO)   Yes No   Sig: Take 800 mg by mouth as needed (For cluster headaches)      Facility-Administered Medications: None       Past Medical History:   Diagnosis Date    Headaches, cluster     Migraine        Past Surgical History:   Procedure Laterality Date    VOCAL CORD INJECTION         History reviewed  No pertinent family history  I have reviewed and agree with the history as documented  E-Cigarette/Vaping     E-Cigarette/Vaping Substances     Social History     Tobacco Use    Smoking status: Former Smoker     Types: Cigarettes     Quit date: 5/25/2019     Years since quitting: 3 1    Smokeless tobacco: Never Used   Substance Use Topics    Alcohol use: Never    Drug use: Never        Review of Systems   Constitutional: Negative for activity change, chills and fever  HENT: Negative for congestion, ear pain, rhinorrhea, sinus pain and sore throat  Eyes: Negative for pain and visual disturbance  Respiratory: Negative for cough, chest tightness and shortness of breath  Cardiovascular: Negative for chest pain and palpitations  Gastrointestinal: Negative for abdominal pain, constipation, diarrhea, nausea and vomiting     Genitourinary: Negative for dysuria and hematuria  Musculoskeletal: Positive for joint swelling  Negative for arthralgias and back pain  Left hand swelling   Skin: Negative for color change and rash  Neurological: Negative for dizziness, seizures, syncope, weakness, light-headedness, numbness and headaches  Psychiatric/Behavioral: Negative for agitation, behavioral problems, dysphoric mood and hallucinations  All other systems reviewed and are negative  Physical Exam  ED Triage Vitals [07/27/22 2337]   Temperature Pulse Respirations Blood Pressure SpO2   97 7 °F (36 5 °C) 102 18 137/79 97 %      Temp Source Heart Rate Source Patient Position - Orthostatic VS BP Location FiO2 (%)   Oral Monitor Lying Right arm --      Pain Score       3             Orthostatic Vital Signs  Vitals:    07/27/22 2337   BP: 137/79   Pulse: 102   Patient Position - Orthostatic VS: Lying       Physical Exam  Vitals and nursing note reviewed  Constitutional:       Appearance: Normal appearance  He is well-developed and normal weight  HENT:      Head: Normocephalic and atraumatic  Right Ear: External ear normal       Left Ear: External ear normal       Nose: Nose normal       Mouth/Throat:      Mouth: Mucous membranes are moist    Eyes:      Extraocular Movements: Extraocular movements intact  Conjunctiva/sclera: Conjunctivae normal       Pupils: Pupils are equal, round, and reactive to light  Cardiovascular:      Rate and Rhythm: Normal rate and regular rhythm  Heart sounds: No murmur heard  Pulmonary:      Effort: Pulmonary effort is normal  No respiratory distress  Breath sounds: Normal breath sounds  Abdominal:      General: Abdomen is flat  Palpations: Abdomen is soft  Tenderness: There is no abdominal tenderness  There is no guarding or rebound     Musculoskeletal:      Right shoulder: Normal       Left shoulder: Normal       Right upper arm: Normal       Left upper arm: Normal       Right elbow: Normal  Left elbow: Normal       Right forearm: Normal       Left forearm: Normal       Right wrist: Normal       Left wrist: Normal       Right hand: Normal       Left hand: Swelling and bony tenderness present  No tenderness  Decreased range of motion  Normal capillary refill  Normal pulse  Cervical back: Normal range of motion and neck supple  Comments: Swelling over left hand 4th and 5th metacarple, no obvious bony abnormalities to palpation  Patient with swelling over the metacarpals  Skin:     General: Skin is warm and dry  Capillary Refill: Capillary refill takes less than 2 seconds  Neurological:      General: No focal deficit present  Mental Status: He is alert and oriented to person, place, and time  ED Medications  Medications - No data to display    Diagnostic Studies  Results Reviewed     None                 XR hand 3+ views LEFT   ED Interpretation by Murvin Lanes, DO (07/28 0111)   5th metacarpale neck fracture            Procedures  Splint application    Date/Time: 7/28/2022 2:05 AM  Performed by: Murvin Lanes, DO  Authorized by: Murvin Lanes, DO   Universal Protocol:  Consent: Verbal consent obtained  Risks and benefits: risks, benefits and alternatives were discussed  Consent given by: patient  Time out: Immediately prior to procedure a "time out" was called to verify the correct patient, procedure, equipment, support staff and site/side marked as required    Patient understanding: patient states understanding of the procedure being performed  Patient consent: the patient's understanding of the procedure matches consent given  Procedure consent: procedure consent matches procedure scheduled  Patient identity confirmed: verbally with patient      Pre-procedure details:     Sensation:  Normal  Procedure details:     Laterality:  Left    Location: 5th metacarpal Cast type:  Short arm      Splint type:  Ulnar gutter    Supplies:  Cotton padding and elastic bandage  Post-procedure details:     Pain:  Unchanged    Sensation:  Normal    Patient tolerance of procedure: Tolerated well, no immediate complications          ED Course  ED Course as of 07/28/22 0207   Thu Jul 28, 2022   0036 Blood Pressure: 137/79   0036 Temperature: 97 7 °F (36 5 °C)   0036 Temp Source: Oral   0036 Pulse: 102   0036 Respirations: 18   0036 SpO2: 97 %   0036 Patient with obvious left hand abnormality to the 4th and 5th metacarpal with swelling likely a boxer's fracture  Will evaluate with x-rays  Informed him that regardless of the imaging findings we will be splinting him and sending him to Orthopedics  Patient is aware has no questions or concerns at this time he does not want anything for pain will frequently re-evaluate  3569 Patient with fractue will splint and have him follow up with orthopedics    0206 Pt re-examined and evaluated after testing and treatment  Spoke with the patient and feeling improved and sxs have resolved  Will discharge home with close f/u with pcp and instructed to return to the ED if sxs worsen or continue  Pt agrees with the plan for discharge and feels comfortable to go home with proper f/u  Advised to return for worsening or additional problems  Diagnostic tests were reviewed and questions answered  Diagnosis, care plan and treatment options were discussed  The patient understand instructions and will follow up as directed  Patient advised to contact ortho for a follow up appointment  Advised to use tylenol and motrin for pain                                          MDM    Disposition  Final diagnoses:   Boxer's fracture     Time reflects when diagnosis was documented in both MDM as applicable and the Disposition within this note     Time User Action Codes Description Comment    7/28/2022  1:12 AM Traci Wilson Add [address] Boxer's fracture       ED Disposition     ED Disposition   Discharge    Condition   Stable    Date/Time Thu Jul 28, 2022  1:11 AM    Comment   Graham Hawkins discharge to home/self care  Follow-up Information     Follow up With Specialties Details Why Contact Info Additional Information    Mireya Villalobos MD Family Medicine Schedule an appointment as soon as possible for a visit  for follow up [address] Emergency Department Emergency Medicine Go to  As needed, If symptoms worsen [address] [phone]  [address] [facility] [address] [facility] Emergency Department, [address], [place], [place], [address] [facility] Orthopedic Surgery Schedule an appointment as soon as possible for a visit  for follow up [address] [phone]  [phone] [address] [facility], [address] [place], [place], [address]  [facility]  Use Entrance A           Discharge Medication List as of 7/28/2022  1:32 AM      CONTINUE these medications which have NOT CHANGED    Details   Ibuprofen-Famotidine (DUEXIS PO) Take 800 mg by mouth as needed (For cluster headaches), Historical Med           No discharge procedures on file  PDMP Review     None           ED Provider  Attending physically available and evaluated Graham Hawkins I managed the patient along with the ED Attending      Electronically Signed by         Marita Favre, DO  07/28/22 5224

## 2022-07-28 NOTE — TELEPHONE ENCOUNTER
Hello,  Please advise if the following patient can be forced onto the schedule:    Patient: Caren Larsen    : 77    MRN: [id number]    Call back #:[phone] Dedra Moreno    Reason for appointment:mildly displaced 5th metacarpal fracture left hand    Requested doctor/location:[facility]      Thank you

## 2022-08-04 ENCOUNTER — OFFICE VISIT (OUTPATIENT)
Dept: OBGYN CLINIC | Facility: CLINIC | Age: 47
End: 2022-08-04
Payer: COMMERCIAL

## 2022-08-04 VITALS
OXYGEN SATURATION: 97 % | BODY MASS INDEX: 25.84 KG/M2 | HEIGHT: 73 IN | HEART RATE: 67 BPM | DIASTOLIC BLOOD PRESSURE: 78 MMHG | SYSTOLIC BLOOD PRESSURE: 120 MMHG | WEIGHT: 195 LBS

## 2022-08-04 DIAGNOSIS — S62.367A NONDISPLACED FRACTURE OF NECK OF FIFTH METACARPAL BONE, LEFT HAND, INITIAL ENCOUNTER FOR CLOSED FRACTURE: Primary | ICD-10-CM

## 2022-08-04 PROCEDURE — 99204 OFFICE O/P NEW MOD 45 MIN: CPT | Performed by: ORTHOPAEDIC SURGERY

## 2022-08-04 PROCEDURE — 26600 TREAT METACARPAL FRACTURE: CPT | Performed by: ORTHOPAEDIC SURGERY

## 2022-08-04 NOTE — PROGRESS NOTES
[facility] [address]        Miko Liv  [id number]  1975    ORTHOPAEDIC SURGERY OUTPATIENT NOTE  8/4/2022      HISTORY:  40 y o  male who presents the office today for initial evaluation of his left hand  He states that on 07/28/2022, while playing slow pitch softball he dove for a ball and landed off early on his left hand  He noted pain and swelling about his hand after the injury  He presented to the ED after the injury where x-rays were taken and he was placed into a splint  He denies any numbness or tingling  He states he is right-hand dominant  Past Medical History:   Diagnosis Date    Headaches, cluster     Migraine        Past Surgical History:   Procedure Laterality Date    VOCAL CORD INJECTION         Social History     Socioeconomic History    Marital status: Single     Spouse name: Not on file    Number of children: Not on file    Years of education: Not on file    Highest education level: Not on file   Occupational History    Not on file   Tobacco Use    Smoking status: Former Smoker     Types: Cigarettes     Quit date: 5/25/2019     Years since quitting: 3 1    Smokeless tobacco: Never Used   Substance and Sexual Activity    Alcohol use: Never    Drug use: Never    Sexual activity: Not on file   Other Topics Concern    Not on file   Social History Narrative    Not on file     Social Determinants of Health     Financial Resource Strain: Not on file   Food Insecurity: Not on file   Transportation Needs: Not on file   Physical Activity: Not on file   Stress: Not on file   Social Connections: Not on file   Intimate Partner Violence: Not on file   Housing Stability: Not on file       No family history on file       Patient's Medications   New Prescriptions    No medications on file   Previous Medications    IBUPROFEN-FAMOTIDINE (DUEXIS PO)    Take 800 mg by mouth as needed (For cluster headaches)   Modified Medications    No medications on file   Discontinued Medications    No medications on file       No Known Allergies     There were no vitals taken for this visit  REVIEW OF SYSTEMS:  Constitutional: Negative  HEENT: Negative  Respiratory: Negative  Skin: Negative  Neurological: Negative  Psychiatric/Behavioral: Negative  Musculoskeletal: Negative except for that mentioned in the HPI  There were no vitals taken for this visit  Gen: Alert and oriented to person, place, time  HEENT: EOMI, eyes clear, moist mucus membranes, hearing intact  Respiratory: Bilateral chest rise  No audible wheezing found  Cardiovascular: Regular Rate and Rhythm  Abdomen: soft nontender/nondistended     PHYSICAL EXAM:  Left Hand  Mild tenderness palpation of 5th metacarpal  No clinical malrotation  Decreased range of motion  Compartments soft  Brisk capillary refill  S/m intact median, radial, and ulnar nerve     IMAGING:  X-rays performed on 07/28/2022 of his left hand demonstrates a mildly displaced 5th metacarpal neck fracture  ASSESSMENT AND PLAN:  40 y o  male left hand mildly displaced 5th metacarpal neck fracture    X-rays were reviewed with the patient at today's visit  I discussed with the patient that the fracture is in a couple anatomic alignment and we can treat this non operatively in the forms of a short-arm ulnar gutter cast with MCP flexion to 70°  He was placed in a short-arm ulnar gutter cast in MCP flexion at today's visit without any complications  Cast care instructions were provided to the patient at today's visit  He was instructed to remain nonweightbearing with his left upper extremity  I will follow-up with him in 3 weeks for a clinical re-evaluation and repeat left hand x-rays  He understood and had no further questions      Fracture / Dislocation Treatment    Date/Time: 8/4/2022 4:21 PM  Performed by: Christine Baird  Authorized by: Liam Tobin     Patient Location:  [facility]  [place] Protocol:  Consent: Verbal consent obtained  Risks and benefits: risks, benefits and alternatives were discussed  Consent given by: patient  Time out: Immediately prior to procedure a "time out" was called to verify the correct patient, procedure, equipment, support staff and site/side marked as required    Timeout called at: 8/4/2022 4:21 PM   Site marked: the operative site was marked  Patient identity confirmed: verbally with patient      Injury location:  Hand  Location details:  Left hand  Injury type:  Fracture  Fracture type: fifth metacarpal    Neurovascular status: Neurovascularly intact    Distal perfusion: normal    Neurological function: normal    Range of motion: reduced    Local anesthesia used?: No    Manipulation performed?: No    Immobilization:  Cast  Cast type:  Short arm (Ulnar gutter with MCP flexion to 70°)  Supplies used:  Cotton padding and fiberglass  Neurovascular status: Neurovascularly intact    Distal perfusion: normal    Neurological function: normal    Range of motion: unchanged    Patient tolerance:  Patient tolerated the procedure well with no immediate complications          Scribe Attestation    I,:  Corey Pratt am acting as a scribe while in the presence of the attending physician :       I,:  Callie Vazquez personally performed the services described in this documentation    as scribed in my presence :

## 2022-08-25 ENCOUNTER — HOSPITAL ENCOUNTER (OUTPATIENT)
Dept: RADIOLOGY | Facility: HOSPITAL | Age: 47
End: 2022-08-25
Attending: ORTHOPAEDIC SURGERY
Payer: COMMERCIAL

## 2022-08-25 VITALS
HEART RATE: 91 BPM | OXYGEN SATURATION: 98 % | SYSTOLIC BLOOD PRESSURE: 118 MMHG | HEIGHT: 73 IN | BODY MASS INDEX: 25.84 KG/M2 | DIASTOLIC BLOOD PRESSURE: 70 MMHG | WEIGHT: 195 LBS

## 2022-08-25 DIAGNOSIS — S62.367A NONDISPLACED FRACTURE OF NECK OF FIFTH METACARPAL BONE, LEFT HAND, INITIAL ENCOUNTER FOR CLOSED FRACTURE: Primary | ICD-10-CM

## 2022-08-25 DIAGNOSIS — S62.367A NONDISPLACED FRACTURE OF NECK OF FIFTH METACARPAL BONE, LEFT HAND, INITIAL ENCOUNTER FOR CLOSED FRACTURE: ICD-10-CM

## 2022-08-25 PROCEDURE — 99024 POSTOP FOLLOW-UP VISIT: CPT | Performed by: ORTHOPAEDIC SURGERY

## 2022-08-25 PROCEDURE — 73130 X-RAY EXAM OF HAND: CPT

## 2022-08-25 NOTE — PROGRESS NOTES
[facility] [address] [facility] [address]  [id number]  1975    ORTHOPAEDIC SURGERY OUTPATIENT NOTE  8/25/2022      HISTORY:  56 y o  male who presents to the office today for a follow-up evalaution for closed treatment of his left hand mildly displaced 5th metacarpal neck fracture  He has been tolerating the short-arm ulnar gutter cast without any difficulties  He states that his pain is well controlled  Past Medical History:   Diagnosis Date    Headaches, cluster     Migraine        Past Surgical History:   Procedure Laterality Date    VOCAL CORD INJECTION         Social History     Socioeconomic History    Marital status: Single     Spouse name: Not on file    Number of children: Not on file    Years of education: Not on file    Highest education level: Not on file   Occupational History    Not on file   Tobacco Use    Smoking status: Former Smoker     Types: Cigarettes     Quit date: 5/25/2019     Years since quitting: 3 2    Smokeless tobacco: Never Used   Substance and Sexual Activity    Alcohol use: Never    Drug use: Never    Sexual activity: Not on file   Other Topics Concern    Not on file   Social History Narrative    Not on file     Social Determinants of Health     Financial Resource Strain: Not on file   Food Insecurity: Not on file   Transportation Needs: Not on file   Physical Activity: Not on file   Stress: Not on file   Social Connections: Not on file   Intimate Partner Violence: Not on file   Housing Stability: Not on file       No family history on file       Patient's Medications   New Prescriptions    No medications on file   Previous Medications    IBUPROFEN-FAMOTIDINE (DUEXIS PO)    Take 800 mg by mouth as needed (For cluster headaches)   Modified Medications    No medications on file   Discontinued Medications    No medications on file       No Known Allergies     Ht 6' 1" (1 854 m)   BMI 25 73 kg/m²      REVIEW OF SYSTEMS:  Constitutional: Negative  HEENT: Negative  Respiratory: Negative  Skin: Negative  Neurological: Negative  Psychiatric/Behavioral: Negative  Musculoskeletal: Negative except for that mentioned in the HPI  Ht 6' 1" (1 854 m)   BMI 25 73 kg/m²   Gen: Alert and oriented to person, place, time  HEENT: EOMI, eyes clear, moist mucus membranes, hearing intact  Respiratory: Bilateral chest rise  No audible wheezing found  Cardiovascular: Regular Rate and Rhythm  Abdomen: soft nontender/nondistended     PHYSICAL EXAM:  Left Hand  NTTP 5th metacarpal neck  Decreased range of motion secondary to stiffness  No clinical malrotation  Compartments soft  Brisk capillary refill  S/m intact median, radial, and ulnar nerve     IMAGING:  X-rays performed in the office today of his left hand demonstrates interval signs healing with callus formation of a mildly displaced 5th metacarpal neck fracture  ASSESSMENT AND PLAN:  56 y o  male left hand mildly displaced 5th metacarpal neck fracture    X-rays were reviewed with the patient at today's visit  Am pleased with his clinical examination at today's visit  He was fitted for a left TKO brace at today's visit  A prescription for formal occupational therapy was provided to the patient at today's visit  I will follow-up with him as needed  He understood and had no further questions      Scribe Attestation    I,:  Singh Najera am acting as a scribe while in the presence of the attending physician :       I,:  Maya Olivier personally performed the services described in this documentation    as scribed in my presence :

## 2024-02-03 ENCOUNTER — HOSPITAL ENCOUNTER (EMERGENCY)
Facility: HOSPITAL | Age: 49
Discharge: HOME | End: 2024-02-03
Attending: EMERGENCY MEDICINE
Payer: COMMERCIAL

## 2024-02-03 ENCOUNTER — APPOINTMENT (EMERGENCY)
Dept: RADIOLOGY | Facility: HOSPITAL | Age: 49
End: 2024-02-03
Payer: COMMERCIAL

## 2024-02-03 VITALS
HEART RATE: 110 BPM | DIASTOLIC BLOOD PRESSURE: 95 MMHG | RESPIRATION RATE: 18 BRPM | TEMPERATURE: 99.4 F | OXYGEN SATURATION: 98 % | SYSTOLIC BLOOD PRESSURE: 154 MMHG

## 2024-02-03 DIAGNOSIS — S00.03XA HEMATOMA OF FRONTAL SCALP, INITIAL ENCOUNTER: ICD-10-CM

## 2024-02-03 DIAGNOSIS — S09.90XA INJURY OF HEAD, INITIAL ENCOUNTER: Primary | ICD-10-CM

## 2024-02-03 PROCEDURE — G1004 CDSM NDSC: HCPCS

## 2024-02-03 PROCEDURE — 70450 CT HEAD/BRAIN W/O DYE: CPT

## 2024-02-03 PROCEDURE — 99283 EMERGENCY DEPT VISIT LOW MDM: CPT

## 2024-02-03 PROCEDURE — 72125 CT NECK SPINE W/O DYE: CPT

## 2024-02-03 PROCEDURE — 99284 EMERGENCY DEPT VISIT MOD MDM: CPT | Performed by: EMERGENCY MEDICINE

## 2024-02-03 PROCEDURE — 96372 THER/PROPH/DIAG INJ SC/IM: CPT

## 2024-02-03 RX ORDER — KETOROLAC TROMETHAMINE 30 MG/ML
15 INJECTION, SOLUTION INTRAMUSCULAR; INTRAVENOUS ONCE
Status: COMPLETED | OUTPATIENT
Start: 2024-02-03 | End: 2024-02-03

## 2024-02-03 RX ORDER — ACETAMINOPHEN 325 MG/1
650 TABLET ORAL ONCE
Status: COMPLETED | OUTPATIENT
Start: 2024-02-03 | End: 2024-02-03

## 2024-02-03 RX ADMIN — ACETAMINOPHEN 650 MG: 325 TABLET ORAL at 16:50

## 2024-02-03 RX ADMIN — KETOROLAC TROMETHAMINE 15 MG: 30 INJECTION, SOLUTION INTRAMUSCULAR; INTRAVENOUS at 16:50

## 2024-02-03 NOTE — DISCHARGE INSTRUCTIONS
You were seen today for a head injury. Your imaging was normal. Please take tylenol and motrin as needed for pain.      Information on concussion is at the back of this package     Please return to the ER for worsening headache with neurologic deficits, nausea, or vomting

## 2024-02-03 NOTE — ED PROVIDER NOTES
History  Chief Complaint   Patient presents with    Head Injury     Pt states that he was playing frizz-bee with friends, turned into a stop sign and hit his forehead. No LOC no thinners.      HPI    Sánchez Mesa is a 48 y.o. male presenting with chief complaint of head strike without LOC. Patient states ~1.5 hours ago he was running to catch a frisbee, looking over his right shoulder, when he ran right into a metal pole. He did not fall to the ground or loose consciousness. He denies any nausea, vomiting, or vision changes but is developing a headache. He dose state he has some numbness and tingling in his L shoulder and left hand that is new. He thinks maybe he strained his shoulder. He has not taken anything for pain. He does have a prominent left frontal scalp hematoma. He denies any neck pain and has full ROM of neck without pain.     Prior to Admission Medications   Prescriptions Last Dose Informant Patient Reported? Taking?   Ibuprofen-Famotidine (DUEXIS PO)   Yes No   Sig: Take 800 mg by mouth as needed (For cluster headaches)   Patient not taking: Reported on 2022      Facility-Administered Medications: None       Past Medical History:   Diagnosis Date    Headaches, cluster     Migraine        Past Surgical History:   Procedure Laterality Date    VOCAL CORD INJECTION         History reviewed. No pertinent family history.  I have reviewed and agree with the history as documented.    E-Cigarette/Vaping     E-Cigarette/Vaping Substances     Social History     Tobacco Use    Smoking status: Former     Current packs/day: 0.00     Types: Cigarettes     Quit date: 2019     Years since quittin.7    Smokeless tobacco: Never   Substance Use Topics    Alcohol use: Never    Drug use: Never        Review of Systems   Constitutional:  Negative for activity change, chills and fever.   Eyes:  Negative for photophobia and visual disturbance.   Gastrointestinal:  Negative for nausea and vomiting.    Musculoskeletal:  Negative for neck stiffness.   Skin:  Positive for wound. Negative for color change.   Neurological:  Positive for numbness. Negative for dizziness, syncope, weakness, light-headedness and headaches.       Physical Exam  ED Triage Vitals [02/03/24 1531]   Temperature Pulse Respirations Blood Pressure SpO2   99.4 °F (37.4 °C) (!) 110 18 154/95 98 %      Temp Source Heart Rate Source Patient Position - Orthostatic VS BP Location FiO2 (%)   Oral Monitor Sitting Left arm --      Pain Score       8             Orthostatic Vital Signs  Vitals:    02/03/24 1531   BP: 154/95   Pulse: (!) 110   Patient Position - Orthostatic VS: Sitting       Physical Exam  Constitutional:       General: He is not in acute distress.     Appearance: Normal appearance.   HENT:      Head: Normocephalic and atraumatic.      Comments: L frontal hematoma     Nose: Nose normal.   Eyes:      Extraocular Movements: Extraocular movements intact.      Pupils: Pupils are equal, round, and reactive to light.   Cardiovascular:      Rate and Rhythm: Normal rate.   Pulmonary:      Effort: Pulmonary effort is normal.   Abdominal:      General: Abdomen is flat.      Palpations: Abdomen is soft.   Musculoskeletal:      Cervical back: Normal range of motion and neck supple. No rigidity or tenderness.   Skin:     General: Skin is warm and dry.      Capillary Refill: Capillary refill takes less than 2 seconds.   Neurological:      General: No focal deficit present.      Mental Status: He is alert and oriented to person, place, and time.      Cranial Nerves: No cranial nerve deficit.      Motor: No weakness.         ED Medications  Medications   acetaminophen (TYLENOL) tablet 650 mg (650 mg Oral Given 2/3/24 1650)   ketorolac (TORADOL) injection 15 mg (15 mg Intramuscular Given 2/3/24 1650)       Diagnostic Studies  Results Reviewed       None                   CT head without contrast   Final Result by Familia Correa MD (02/03 5809)       No acute intracranial abnormality.                  Workstation performed: [id number]         CT spine cervical without contrast   Final Result by Familia Correa MD (02/03 1748)      No cervical spine fracture or traumatic malalignment.                  Workstation performed: [id number]               Procedures  Procedures      ED Course  ED Course as of 02/06/24 2112   Sat Feb 03, 2024   1802 CT head without contrast  No acute intracranial abnormality.        1802 CT spine cervical without contrast   1802 CT spine cervical without contrast  No cervical spine fracture or traumatic malalignment.                                     SBIRT 20yo+      Flowsheet Row Most Recent Value   Initial Alcohol Screen: US AUDIT-C     1. How often do you have a drink containing alcohol? 0 Filed at: 02/03/2024 1532   2. How many drinks containing alcohol do you have on a typical day you are drinking?  0 Filed at: 02/03/2024 1532   3a. Male UNDER 65: How often do you have five or more drinks on one occasion? 0 Filed at: 02/03/2024 1532   3b. FEMALE Any Age, or MALE 65+: How often do you have 4 or more drinks on one occassion? 0 Filed at: 02/03/2024 1532   Audit-C Score 0 Filed at: 02/03/2024 1532   LISY: How many times in the past year have you...    Used an illegal drug or used a prescription medication for non-medical reasons? Never Filed at: 02/03/2024 1532                  Medical Decision Making  Amount and/or Complexity of Data Reviewed  Radiology: ordered. Decision-making details documented in ED Course.    Risk  OTC drugs.  Prescription drug management.        Patient is a 48 y.o. male  who presents to the ED with heads trike.    Vital signs stable. Exam as listed above    Differential diagnosis includes but is not limited to concussion, ICH, central cord - will monitor neuro exam. Per NEXUS, patient requires head CT    Plan CT head without contrast, ET C-spine without contrast, Tylenol, Toradol    View ED course above  for further discussion on patient workup.     All labs reviewed and utilized in the medical decision making process  All radiology studies independently viewed by me and interpreted by the radiologist.  I reviewed all testing with the patient.     Upon re-evaluation CT unremarkable.  Patient discharged home with referral to concussion clinic.      Disposition  Final diagnoses:   Injury of head, initial encounter   Hematoma of frontal scalp, initial encounter     Time reflects when diagnosis was documented in both MDM as applicable and the Disposition within this note       Time User Action Codes Description Comment    2/3/2024  6:02 PM Kristina Rene [S09.90XA] Injury of head, initial encounter     2/3/2024  6:02 PM Kristina Rene Add [S00.03XA] Hematoma of frontal scalp, initial encounter           ED Disposition       ED Disposition   Discharge    Condition   Stable    Date/Time   Sat Feb 3, 2024 1802    Comment   Sánchez Mesa discharge to home/self care.                   Follow-up Information    None         Discharge Medication List as of 2/3/2024  6:03 PM        CONTINUE these medications which have NOT CHANGED    Details   Ibuprofen-Famotidine (DUEXIS PO) Take 800 mg by mouth as needed (For cluster headaches), Historical Med               PDMP Review       None             ED Provider  Attending physically available and evaluated Sánchez Mesa. I managed the patient along with the ED Attending.    Electronically Signed by           Kristina Rene MD  02/06/24 8877

## 2024-02-05 NOTE — ED ATTENDING ATTESTATION
2/3/2024  IKiara MD, saw and evaluated the patient. I have discussed the patient with the resident/non-physician practitioner and agree with the resident's/non-physician practitioner's findings, Plan of Care, and MDM as documented in the resident's/non-physician practitioner's note, except where noted. All available labs and Radiology studies were reviewed.  I was present for key portions of any procedure(s) performed by the resident/non-physician practitioner and I was immediately available to provide assistance.       At this point I agree with the current assessment done in the Emergency Department.  I have conducted an independent evaluation of this patient a history and physical is as follows:    48 y.o. male presenting with closed head injury. Patient was playing frisbee with his friends, was running and tracking the frisbee, looking over his right shoulder, when he struck his forehead on the pole. He was knocked backwards. Did not lose consciousness. Has a headache. No nausea or vomiting. In addition to striking his forehead, patient felt numbness and tingling in his right hand/fingers. No other injuries. Patient presents with significant other.    ED Triage Vitals [02/03/24 1531]   Temperature Pulse Respirations Blood Pressure SpO2   99.4 °F (37.4 °C) (!) 110 18 154/95 98 %      Temp Source Heart Rate Source Patient Position - Orthostatic VS BP Location FiO2 (%)   Oral Monitor Sitting Left arm --      Pain Score       8           Constitutional:  Awake, alert, oriented.  No acute distress.  HEENT: There is a 8 x 2 cm left forehead hematoma without underlying bony instability. No periorbital ecchymosis. Negative Dillard's sign. Pupils reactive and symmetric bilaterally. Sclera anicteric, conjunctiva not injected.  Moist oral mucosa.  Cardiac:  Appears well-perfused  Respiratory:  Breathing comfortably on room air  Abdomen:  Nondistended  MSK: No midline c-spine tenderness, no cervical paraspinal  group tenderness. Patient is able to demonstrate 5/5 strength in deltoids, biceps, triceps, wrist flexors and extensors, and intrinsic hand muscles bilaterally. SILT in median, radial and ulnar nn distributions bilaterally. Patient able to ambulate with narrow steady based gait.  Integument:  No rashes over exposed areas, cap refill less than 2 seconds  Neurologic:  Awake, alert, and oriented x3.  Nonfocal exam.  Psychiatric:  Normal affect    CT head without contrast   Final Result      No acute intracranial abnormality.                  Workstation performed: [id number]         CT spine cervical without contrast   Final Result      No cervical spine fracture or traumatic malalignment.                  Workstation performed: [id number]               ED Course     Medications   acetaminophen (TYLENOL) tablet 650 mg (650 mg Oral Given 2/3/24 1650)   ketorolac (TORADOL) injection 15 mg (15 mg Intramuscular Given 2/3/24 1650)     48 y.o. male presenting with closed head injury and right hand tingling after striking his head while looking to the side. VS reviewed, tachycardic and hypertensive. GCS 15, patient is overall well appearing. Concerning closed head injury, patient has a hematoma which is not expanding, and may have a concussion as a result (will know within the next several days). Concerning right hand tingling, this could be due to patient having some stretch on the right brachial plexus while reaching for a frisbee and sustaining an injury, or it could be a spinal cord injury given that patient was turning his head to the right when he sustained an impact to his head.   CT head and c-spine are both reassuring and when I evaluated the patient, the numbness and tingling in right hand were already resolved, which is quite reassuring. I do not believe we need to obtain c-spine MRI at this time. However, if patient does develop any upper extremity weakness, numbness or tingling, he should be  re-evaluated.      Critical Care Time  Procedures